# Patient Record
Sex: MALE | Race: OTHER | Employment: FULL TIME | URBAN - METROPOLITAN AREA
[De-identification: names, ages, dates, MRNs, and addresses within clinical notes are randomized per-mention and may not be internally consistent; named-entity substitution may affect disease eponyms.]

---

## 2024-11-13 ENCOUNTER — APPOINTMENT (OUTPATIENT)
Dept: CT IMAGING | Age: 34
End: 2024-11-13

## 2024-11-13 ENCOUNTER — HOSPITAL ENCOUNTER (EMERGENCY)
Age: 34
Discharge: HOME | End: 2024-11-13

## 2024-11-13 ENCOUNTER — APPOINTMENT (OUTPATIENT)
Dept: ULTRASOUND IMAGING | Age: 34
End: 2024-11-13

## 2024-11-13 VITALS
RESPIRATION RATE: 16 BRPM | HEIGHT: 62 IN | BODY MASS INDEX: 25.56 KG/M2 | SYSTOLIC BLOOD PRESSURE: 113 MMHG | DIASTOLIC BLOOD PRESSURE: 83 MMHG | WEIGHT: 138.89 LBS | OXYGEN SATURATION: 99 % | HEART RATE: 60 BPM | TEMPERATURE: 98 F

## 2024-11-13 DIAGNOSIS — N50.812 PAIN IN LEFT TESTICLE: ICD-10-CM

## 2024-11-13 DIAGNOSIS — N20.0 KIDNEY STONE: Primary | ICD-10-CM

## 2024-11-13 LAB
ALBUMIN SERPL-MCNC: 4.5 G/DL (ref 3.4–5)
ALBUMIN/GLOB SERPL: 1.7 {RATIO} (ref 1.1–2.2)
ALP SERPL-CCNC: 105 U/L (ref 40–129)
ALT SERPL-CCNC: 50 U/L (ref 10–40)
ANION GAP SERPL CALCULATED.3IONS-SCNC: 12 MMOL/L (ref 3–16)
AST SERPL-CCNC: 32 U/L (ref 15–37)
BACTERIA URNS QL MICRO: ABNORMAL /HPF
BASOPHILS # BLD: 0 K/UL (ref 0–0.2)
BASOPHILS NFR BLD: 0.4 %
BILIRUB SERPL-MCNC: 1.3 MG/DL (ref 0–1)
BILIRUB UR QL STRIP.AUTO: NEGATIVE
BUN SERPL-MCNC: 15 MG/DL (ref 7–20)
CALCIUM SERPL-MCNC: 9.2 MG/DL (ref 8.3–10.6)
CHLORIDE SERPL-SCNC: 103 MMOL/L (ref 99–110)
CLARITY UR: ABNORMAL
CO2 SERPL-SCNC: 23 MMOL/L (ref 21–32)
COLOR UR: YELLOW
CREAT SERPL-MCNC: 1 MG/DL (ref 0.9–1.3)
DEPRECATED RDW RBC AUTO: 13.4 % (ref 12.4–15.4)
EOSINOPHIL # BLD: 0.3 K/UL (ref 0–0.6)
EOSINOPHIL NFR BLD: 3.1 %
EPI CELLS #/AREA URNS HPF: ABNORMAL /HPF (ref 0–5)
GFR SERPLBLD CREATININE-BSD FMLA CKD-EPI: >90 ML/MIN/{1.73_M2}
GLUCOSE SERPL-MCNC: 140 MG/DL (ref 70–99)
GLUCOSE UR STRIP.AUTO-MCNC: NEGATIVE MG/DL
HCT VFR BLD AUTO: 47 % (ref 40.5–52.5)
HGB BLD-MCNC: 16.7 G/DL (ref 13.5–17.5)
HGB UR QL STRIP.AUTO: ABNORMAL
KETONES UR STRIP.AUTO-MCNC: NEGATIVE MG/DL
LEUKOCYTE ESTERASE UR QL STRIP.AUTO: NEGATIVE
LIPASE SERPL-CCNC: 37 U/L (ref 13–60)
LYMPHOCYTES # BLD: 1.7 K/UL (ref 1–5.1)
LYMPHOCYTES NFR BLD: 16.6 %
MAGNESIUM SERPL-MCNC: 2.3 MG/DL (ref 1.8–2.4)
MCH RBC QN AUTO: 31.3 PG (ref 26–34)
MCHC RBC AUTO-ENTMCNC: 35.4 G/DL (ref 31–36)
MCV RBC AUTO: 88.5 FL (ref 80–100)
MONOCYTES # BLD: 0.7 K/UL (ref 0–1.3)
MONOCYTES NFR BLD: 7 %
NEUTROPHILS # BLD: 7.6 K/UL (ref 1.7–7.7)
NEUTROPHILS NFR BLD: 72.9 %
NITRITE UR QL STRIP.AUTO: NEGATIVE
PH UR STRIP.AUTO: 6 [PH] (ref 5–8)
PLATELET # BLD AUTO: 289 K/UL (ref 135–450)
PMV BLD AUTO: 7.8 FL (ref 5–10.5)
POTASSIUM SERPL-SCNC: 3.5 MMOL/L (ref 3.5–5.1)
PROT SERPL-MCNC: 7.2 G/DL (ref 6.4–8.2)
PROT UR STRIP.AUTO-MCNC: NEGATIVE MG/DL
RBC # BLD AUTO: 5.31 M/UL (ref 4.2–5.9)
RBC #/AREA URNS HPF: ABNORMAL /HPF (ref 0–4)
SODIUM SERPL-SCNC: 138 MMOL/L (ref 136–145)
SP GR UR STRIP.AUTO: >=1.03 (ref 1–1.03)
UA COMPLETE W REFLEX CULTURE PNL UR: ABNORMAL
UA DIPSTICK W REFLEX MICRO PNL UR: YES
URN SPEC COLLECT METH UR: ABNORMAL
UROBILINOGEN UR STRIP-ACNC: 0.2 E.U./DL
WBC # BLD AUTO: 10.4 K/UL (ref 4–11)
WBC #/AREA URNS HPF: ABNORMAL /HPF (ref 0–5)

## 2024-11-13 PROCEDURE — 74177 CT ABD & PELVIS W/CONTRAST: CPT

## 2024-11-13 PROCEDURE — 83690 ASSAY OF LIPASE: CPT

## 2024-11-13 PROCEDURE — 85025 COMPLETE CBC W/AUTO DIFF WBC: CPT

## 2024-11-13 PROCEDURE — 80053 COMPREHEN METABOLIC PANEL: CPT

## 2024-11-13 PROCEDURE — 36415 COLL VENOUS BLD VENIPUNCTURE: CPT

## 2024-11-13 PROCEDURE — 6370000000 HC RX 637 (ALT 250 FOR IP)

## 2024-11-13 PROCEDURE — 83735 ASSAY OF MAGNESIUM: CPT

## 2024-11-13 PROCEDURE — 99284 EMERGENCY DEPT VISIT MOD MDM: CPT

## 2024-11-13 PROCEDURE — 76870 US EXAM SCROTUM: CPT

## 2024-11-13 PROCEDURE — 6360000002 HC RX W HCPCS

## 2024-11-13 PROCEDURE — 6360000004 HC RX CONTRAST MEDICATION

## 2024-11-13 PROCEDURE — 93975 VASCULAR STUDY: CPT

## 2024-11-13 PROCEDURE — 96374 THER/PROPH/DIAG INJ IV PUSH: CPT

## 2024-11-13 PROCEDURE — 81001 URINALYSIS AUTO W/SCOPE: CPT

## 2024-11-13 RX ORDER — TAMSULOSIN HYDROCHLORIDE 0.4 MG/1
0.4 CAPSULE ORAL DAILY
Qty: 90 CAPSULE | Refills: 1 | Status: SHIPPED | OUTPATIENT
Start: 2024-11-13

## 2024-11-13 RX ORDER — ONDANSETRON 4 MG/1
4 TABLET, ORALLY DISINTEGRATING ORAL 3 TIMES DAILY PRN
Qty: 21 TABLET | Refills: 0 | Status: SHIPPED | OUTPATIENT
Start: 2024-11-13

## 2024-11-13 RX ORDER — HYDROCODONE BITARTRATE AND ACETAMINOPHEN 5; 325 MG/1; MG/1
1 TABLET ORAL EVERY 4 HOURS PRN
Qty: 18 TABLET | Refills: 0 | Status: SHIPPED | OUTPATIENT
Start: 2024-11-13 | End: 2024-11-16

## 2024-11-13 RX ORDER — ACETAMINOPHEN 500 MG
1000 TABLET ORAL ONCE
Status: COMPLETED | OUTPATIENT
Start: 2024-11-13 | End: 2024-11-13

## 2024-11-13 RX ORDER — IOPAMIDOL 755 MG/ML
100 INJECTION, SOLUTION INTRAVASCULAR
Status: COMPLETED | OUTPATIENT
Start: 2024-11-13 | End: 2024-11-13

## 2024-11-13 RX ORDER — KETOROLAC TROMETHAMINE 15 MG/ML
15 INJECTION, SOLUTION INTRAMUSCULAR; INTRAVENOUS ONCE
Status: COMPLETED | OUTPATIENT
Start: 2024-11-13 | End: 2024-11-13

## 2024-11-13 RX ORDER — NAPROXEN 500 MG/1
500 TABLET ORAL 2 TIMES DAILY WITH MEALS
Qty: 60 TABLET | Refills: 0 | Status: SHIPPED | OUTPATIENT
Start: 2024-11-13

## 2024-11-13 RX ADMIN — KETOROLAC TROMETHAMINE 15 MG: 15 INJECTION, SOLUTION INTRAMUSCULAR; INTRAVENOUS at 14:22

## 2024-11-13 RX ADMIN — IOPAMIDOL 100 ML: 755 INJECTION, SOLUTION INTRAVENOUS at 15:12

## 2024-11-13 RX ADMIN — ACETAMINOPHEN 1000 MG: 500 TABLET ORAL at 14:20

## 2024-11-13 ASSESSMENT — PAIN DESCRIPTION - DESCRIPTORS: DESCRIPTORS: ACHING

## 2024-11-13 ASSESSMENT — ENCOUNTER SYMPTOMS
VOMITING: 0
DIARRHEA: 0
BLOOD IN STOOL: 0
ABDOMINAL PAIN: 1
NAUSEA: 0

## 2024-11-13 ASSESSMENT — PAIN SCALES - GENERAL
PAINLEVEL_OUTOF10: 4
PAINLEVEL_OUTOF10: 0

## 2024-11-13 ASSESSMENT — PAIN DESCRIPTION - ORIENTATION: ORIENTATION: LOWER;LEFT

## 2024-11-13 NOTE — ED NOTES
Report given to Gisele DE LEON at [facility]. RN has no further questions at this time. Pt was instructed to go straight to [facility] without stopping, eating, or drinking. Pt has no further questions at this time.

## 2024-11-13 NOTE — DISCHARGE INSTRUCTIONS
Your CT shows a kidney stone.  This is likely causing your pain however with the testicular pain I still recommend that you get the ultrasound that we talked about.  This unfortunately is not available at [place] but Kitty Millan is aware that you will be coming in just for the ultrasound.  Go straight to the emergency last.  Do not stop anywhere.  Do not eat or drink anything until you get your ultrasound and your results are discussed with you.    Please remember if there is abnormal findings that you may require admission into the hospital if you require surgery.     Otherwise follow-up with urology at the contact number listed above for management of the kidney stone.  You were handed paper prescriptions which include 2 pain medications (1 is Norco and 1 is naproxen) the Flomax is a medication to relax the ureters to help the kidney stone pass.  The Zofran is a nausea medication in case your pain increases and you experience nausea    Increase your fluids at home.

## 2024-11-13 NOTE — ED PROVIDER NOTES
[facility]  EMERGENCY DEPARTMENT ENCOUNTER        Pt Name: Jersey Ervin  MRN: [id number]  Birthdate 1990  Date of evaluation: 11/13/2024  Provider: ОЛЬГА Lerner CNP  PCP: No primary care provider on file.  Note Started: 2:17 PM EST 11/13/24      BEVERLEY. I have evaluated this patient.        CHIEF COMPLAINT       Chief Complaint   Patient presents with    Groin Pain     Complaints of left testicle pain starting 11/9, pt describes pain as an ache. Pt denies current N/V/D.        HISTORY OF PRESENT ILLNESS: 1 or more Elements     History From:     Limitations to history : Language Guatemalan    Chief Complaint: Left lower quadrant abdominal pain radiating into the left groin and left testicle    Jersey Ervin is a 34 y.o. male with no known significant or pertinent prior medical history who presents to the emergency department for evaluation of left lower quadrant abdominal pain radiating into the left inguinal region and into the left testicle.  First noticed symptoms 4 days ago on November 9.  Pain did onset suddenly but has been waxing and waning since.  Currently upon arrival to the emergency department the pain is well-controlled and rated 4 out of 10 in intensity.  Described currently as an ache however intermittently will be sharp and stabbing.  Denies any dysuria, frequency, hesitancy, gross hematuria.  Denies prior medical history including Crohn's, IBS, diverticulitis.  Denies melena or hematochezia.  Denies fevers, chills, nausea, vomiting.    HPI was obtained with use of a Guatemalan-speaking     Nursing Notes were all reviewed and agreed with or any disagreements were addressed in the HPI.    REVIEW OF SYSTEMS :      Review of Systems   Constitutional:  Negative for chills, fatigue and fever.   Gastrointestinal:  Positive for abdominal pain (Left lower quadrant). Negative for blood in stool, diarrhea, nausea and vomiting.   Genitourinary:

## 2024-11-14 NOTE — ED PROVIDER NOTES
[facility] EMERGENCY DEPARTMENT  Emergency Department Encounter    Patient Name: Jersey Ervin  MRN: [id number]  YOB: 1990  Date of Evaluation: 11/13/2024  Provider: No primary care provider on file.  Note Started: 7:15 PM EST 11/13/24    CHIEF COMPLAINT  Groin Pain (Complaints of left testicle pain starting 11/9, pt describes pain as an ache. Pt denies current N/V/D. )    SHARED SERVICE VISIT  Evaluated by BEVERLEY.  My supervising physician was available for consultation.     HISTORY OF PRESENT ILLNESS  Jersey Ervin is a 34 y.o. male who presents to the ED patient was sent to our department from local stand-alone ED for further evaluation of testicular pain.  Patient was diagnosed with a 6 mm obstructing left ureteral stone.  Lab work obtained and was unremarkable with urine showing large amount of blood without evidence for infectious process.  Based on testicular pain he was sent over to rule out torsion.  Patient denies penile drainage.  No vomiting.  No fevers.  No headaches or dizziness.  No chest pain or shortness of breath.    No other complaints, modifying factors or associated symptoms.     Nursing notes reviewed were all reviewed and agreed with or any disagreements were addressed in the HPI.    PMH:  History reviewed. No pertinent past medical history.    Surgical History:  History reviewed. No pertinent surgical history.    Family History:  History reviewed. No pertinent family history.    Social History:  Social History     Socioeconomic History    Marital status: Single     Spouse name: Not on file    Number of children: Not on file    Years of education: Not on file    Highest education level: Not on file   Occupational History    Not on file   Tobacco Use    Smoking status: Never    Smokeless tobacco: Never   Substance and Sexual Activity    Alcohol use: Not Currently    Drug use: Not on file    Sexual activity: Not on file   Other Topics Concern    Not on